# Patient Record
Sex: FEMALE | Race: WHITE
[De-identification: names, ages, dates, MRNs, and addresses within clinical notes are randomized per-mention and may not be internally consistent; named-entity substitution may affect disease eponyms.]

---

## 2020-06-30 ENCOUNTER — HOSPITAL ENCOUNTER (INPATIENT)
Dept: HOSPITAL 62 - LR | Age: 20
LOS: 4 days | Discharge: HOME | End: 2020-07-04
Attending: OBSTETRICS & GYNECOLOGY | Admitting: OBSTETRICS & GYNECOLOGY
Payer: OTHER GOVERNMENT

## 2020-06-30 DIAGNOSIS — O48.0: Primary | ICD-10-CM

## 2020-06-30 DIAGNOSIS — Z3A.41: ICD-10-CM

## 2020-06-30 DIAGNOSIS — D69.6: ICD-10-CM

## 2020-06-30 DIAGNOSIS — B00.89: ICD-10-CM

## 2020-06-30 DIAGNOSIS — F17.210: ICD-10-CM

## 2020-06-30 PROCEDURE — 84550 ASSAY OF BLOOD/URIC ACID: CPT

## 2020-06-30 PROCEDURE — 84156 ASSAY OF PROTEIN URINE: CPT

## 2020-06-30 PROCEDURE — 94760 N-INVAS EAR/PLS OXIMETRY 1: CPT

## 2020-06-30 PROCEDURE — 85027 COMPLETE CBC AUTOMATED: CPT

## 2020-06-30 PROCEDURE — 81001 URINALYSIS AUTO W/SCOPE: CPT

## 2020-06-30 PROCEDURE — 83615 LACTATE (LD) (LDH) ENZYME: CPT

## 2020-06-30 PROCEDURE — 86901 BLOOD TYPING SEROLOGIC RH(D): CPT

## 2020-06-30 PROCEDURE — 86592 SYPHILIS TEST NON-TREP QUAL: CPT

## 2020-06-30 PROCEDURE — 86850 RBC ANTIBODY SCREEN: CPT

## 2020-06-30 PROCEDURE — 80053 COMPREHEN METABOLIC PANEL: CPT

## 2020-06-30 PROCEDURE — 36415 COLL VENOUS BLD VENIPUNCTURE: CPT

## 2020-06-30 PROCEDURE — 86900 BLOOD TYPING SEROLOGIC ABO: CPT

## 2020-06-30 PROCEDURE — 86695 HERPES SIMPLEX TYPE 1 TEST: CPT

## 2020-06-30 PROCEDURE — 86696 HERPES SIMPLEX TYPE 2 TEST: CPT

## 2020-06-30 PROCEDURE — 82570 ASSAY OF URINE CREATININE: CPT

## 2020-06-30 PROCEDURE — 80307 DRUG TEST PRSMV CHEM ANLYZR: CPT

## 2020-06-30 PROCEDURE — 85025 COMPLETE CBC W/AUTO DIFF WBC: CPT

## 2020-07-01 LAB
ADD MANUAL DIFF: NO
ALBUMIN SERPL-MCNC: 3.1 G/DL (ref 3.5–5)
ALP SERPL-CCNC: 119 U/L (ref 38–126)
ANION GAP SERPL CALC-SCNC: 6 MMOL/L (ref 5–19)
APPEARANCE UR: CLEAR
APTT PPP: YELLOW S
AST SERPL-CCNC: 36 U/L (ref 14–36)
BARBITURATES UR QL SCN: NEGATIVE
BASOPHILS # BLD AUTO: 0 10^3/UL (ref 0–0.2)
BASOPHILS NFR BLD AUTO: 0.3 % (ref 0–2)
BILIRUB DIRECT SERPL-MCNC: 0 MG/DL (ref 0–0.4)
BILIRUB SERPL-MCNC: 0.3 MG/DL (ref 0.2–1.3)
BILIRUB UR QL STRIP: NEGATIVE
BUN SERPL-MCNC: 10 MG/DL (ref 7–20)
CALCIUM: 9.5 MG/DL (ref 8.4–10.2)
CHLORIDE SERPL-SCNC: 105 MMOL/L (ref 98–107)
CO2 SERPL-SCNC: 21 MMOL/L (ref 22–30)
CREAT UR-MCNC: 46.2 MG/DL (ref 16–327)
EOSINOPHIL # BLD AUTO: 0 10^3/UL (ref 0–0.6)
EOSINOPHIL NFR BLD AUTO: 0.1 % (ref 0–6)
ERYTHROCYTE [DISTWIDTH] IN BLOOD BY AUTOMATED COUNT: 13.9 % (ref 11.5–14)
GLUCOSE SERPL-MCNC: 115 MG/DL (ref 75–110)
GLUCOSE UR STRIP-MCNC: NEGATIVE MG/DL
HCT VFR BLD CALC: 35.7 % (ref 36–47)
HGB BLD-MCNC: 12.3 G/DL (ref 12–15.5)
KETONES UR STRIP-MCNC: NEGATIVE MG/DL
LYMPHOCYTES # BLD AUTO: 1.7 10^3/UL (ref 0.5–4.7)
LYMPHOCYTES NFR BLD AUTO: 16.9 % (ref 13–45)
MCH RBC QN AUTO: 31.9 PG (ref 27–33.4)
MCHC RBC AUTO-ENTMCNC: 34.5 G/DL (ref 32–36)
MCV RBC AUTO: 93 FL (ref 80–97)
METHADONE UR QL SCN: NEGATIVE
MONOCYTES # BLD AUTO: 0.5 10^3/UL (ref 0.1–1.4)
MONOCYTES NFR BLD AUTO: 4.9 % (ref 3–13)
NEUTROPHILS # BLD AUTO: 7.8 10^3/UL (ref 1.7–8.2)
NEUTS SEG NFR BLD AUTO: 77.8 % (ref 42–78)
NITRITE UR QL STRIP: NEGATIVE
PCP UR QL SCN: NEGATIVE
PH UR STRIP: 7 [PH] (ref 5–9)
PLATELET # BLD: 114 10^3/UL (ref 150–450)
POTASSIUM SERPL-SCNC: 4.1 MMOL/L (ref 3.6–5)
PROT SERPL-MCNC: 6.1 G/DL (ref 6.3–8.2)
PROT UR STRIP-MCNC: 30 MG/DL
PROT UR STRIP-MCNC: 38.8 MG/DL (ref ?–12)
RBC # BLD AUTO: 3.86 10^6/UL (ref 3.72–5.28)
SP GR UR STRIP: 1
TOTAL CELLS COUNTED % (AUTO): 100 %
UR PRO/CREAT RATIO RESULT: 0.8 MG/MG (ref 0–0.2)
URATE SERPL-MCNC: 5.8 MG/DL (ref 2.5–6.2)
URINE AMPHETAMINES SCREEN: NEGATIVE
URINE BENZODIAZEPINES SCREEN: NEGATIVE
URINE COCAINE SCREEN: NEGATIVE
URINE MARIJUANA (THC) SCREEN: NEGATIVE
UROBILINOGEN UR-MCNC: NEGATIVE MG/DL (ref ?–2)
WBC # BLD AUTO: 10.1 10^3/UL (ref 4–10.5)

## 2020-07-01 NOTE — ADMISSION PHYSICAL
=================================================================



=================================================================

Datetime Report Generated by CPN: 2020 21:03

   

   

=================================================================

CURRENT ADMISSION

=================================================================

   

Chief Complaint:  Scheduled Induction of Labor

Indication for Induction:  Postterm

Admit Impression :  Term, Intrauterine Pregnancy; No Active Labor;

   Intact Membranes; Induction of Labor

Admit Plan:  Admit to Unit; Initiate Labor Induction Protocol

   

=================================================================

ALLERGIES

=================================================================

   

Medication Allergies:  Yes

Medication Allergies:  morphine/Anaphylaxis (2020);

   codeine/Anaphylaxis (2020)

Latex:  No Latex Allergies

   

=================================================================

OBSTETRICAL HISTORY

=================================================================

   

EDC:  2020 00:00

:  1

Para:  0

Term:  0

:  0

SAB:  0

IAB:  0

Ectopic:  0

Livin

Cesareans:  0

VBACs:  0

Multiple Births:  0

Gestational Diabetes:  No

Rh Sensitization:  No

Incompetent Cervix:  No

PILAR:  No

Infertility:  No

ART Treatment:  No

Uterine Anomaly:  No

IUGR:  No

Hx Previous C/S:  No

Macrosomia:  No

Hx Loss/Stillborn:  No

PIH:  No

Hx  Death:  No

Placenta Previa/Abruption:  No

Depression/PP Depression:  No

PTL/PROM:  No

Post Partum Hemorrhage:  No

   

=================================================================

***SEE PRENATAL RECORDS***

=================================================================

   

Cigarettes:  Current Some Day Smoker. [id number]

   

=================================================================

MEDICAL HISTORY

=================================================================

   

Diabetes:  No

Blood Transfusion:  No

Pulmonary Disease (Asthma, TB):  No

Breast Disease:  No

Hypertension:  No

Gyn Surgery:  No

Heart Disease:  No

Hosp/Surgery:  Yes

Autoimmune Disorder:  No

Anesthetic Complications:  No

Kidney Disease:  No

Abnormal Pap Smear:  No

Neuro/Epilepsy:  No

Psychiatric Disorders:  No

Other Medical Diseases:  No

Hepatitis/Liver Disease:  No

Significant Family History:  No

Varicosities/Phlebitis:  No

Trauma/Violence :  No

Thyroid Dysfunction:  No

Medical History Comments:  Ovarian Teratoma at age 4

   

=================================================================

INFECTIOUS HISTORY

=================================================================

   

Gonorrhea:  No

Genital Herpes:  No

Chlamydia:  No

Tuberculosis:  No

Syphilis:  No

Hepatitis:  No

HIV/AIDS Exposure:  No

Rash or Viral Illness:  No

HPV:  No

Infectious History Comments:  Oral HSV, no h/o genital HSV

   

=================================================================

PHYSICAL EXAM

=================================================================

   

General:  Normal

HEENT:  Normal

Neurologic:  Normal

Thyroid:  Deferred

Heart:  Normal

Lungs:  Normal

Breast:  Deferred

Back:  Normal

Abdomen:  Normal

Genitourinary Exam:  Normal

Extremities:  Normal

DTRs:  Normal

Pelvic Type:  Adequate

Vital Signs:  Reviewed

   

=================================================================

VAGINAL EXAM

=================================================================

   

Dilatation:  2

Effacement:  80

Station:  -2

Contraction Comments:  irreg

   

=================================================================

MEMBRANES

=================================================================

   

Membranes:  Intact

   

=================================================================

FETUS A

=================================================================

   

EGA:  41.2

Monitoring:  External US

FHR- Baseline:  145

Variability:  Moderate 6-25bpm

Accelerations:  15X15

Decelerations:  None

FHR Category:  Category I

Fetal Presentation:  Vertex

Admit Comment:  21yo  at 41+2ega presents for scheduled IOL.  ARI

   / by LMP and c/w 1st trimester US.  56# weight gain in pregnancy.

   1hr GTT passed.  H/o ovarian teratoma at age 3yo.  H/o oral HSV. 

   Never had genital outbreak.  No oral prodrome or lesions currently. 

   Pt denies vaginal prodrome or lesions.  SSE negative for lesions. 

   TIters done for Nursery.  GBS positive - PCN for GBS prophy.  EFW

   9#4oz on .  Admit and IOL with cervidil.  Anticipate .

   

=================================================================

INFORMED CONSENT

=================================================================

   

Informed Consent Obtained:  Vaginal Delivery; Induction of Labor;

   Risks, Benefits and Alternatives Discussed

Signature:  Electronically signed by Clementina Lieberman MD ([facility]) on

   2020 at 21:02  with User ID: [email or web address]

## 2020-07-02 PROCEDURE — 0DQR0ZZ REPAIR ANAL SPHINCTER, OPEN APPROACH: ICD-10-PCS | Performed by: OBSTETRICS & GYNECOLOGY

## 2020-07-02 RX ADMIN — IBUPROFEN SCH MG: 800 TABLET, FILM COATED ORAL at 22:45

## 2020-07-02 RX ADMIN — PENICILLIN G POTASSIUM SCH MLS/HR: 5000000 POWDER, FOR SOLUTION INTRAMUSCULAR; INTRAPLEURAL; INTRATHECAL; INTRAVENOUS at 09:25

## 2020-07-02 RX ADMIN — PENICILLIN G POTASSIUM SCH: 5000000 POWDER, FOR SOLUTION INTRAMUSCULAR; INTRAPLEURAL; INTRATHECAL; INTRAVENOUS at 17:36

## 2020-07-02 RX ADMIN — PENICILLIN G POTASSIUM SCH MLS/HR: 5000000 POWDER, FOR SOLUTION INTRAMUSCULAR; INTRAPLEURAL; INTRATHECAL; INTRAVENOUS at 03:56

## 2020-07-02 RX ADMIN — PENICILLIN G POTASSIUM SCH MLS/HR: 5000000 POWDER, FOR SOLUTION INTRAMUSCULAR; INTRAPLEURAL; INTRATHECAL; INTRAVENOUS at 13:40

## 2020-07-02 NOTE — L&D PROGRESS NOTES
=================================================================

PROGRESS NOTES

=================================================================

Datetime Report Generated by CPN: 07/02/2020 15:19

   

   

=================================================================

PROGRESS NOTE

=================================================================

   

Impression:  Normal Progression of Labor

Procedures:  Sterile Vag Exam

Plan:  Continue Present Management; Anticipate Vaginal Delivery

Plan Other:  Pt may have an epidural

Informed Consent Obtained:  Vaginal Delivery; Induction of Labor;

   Risks, Benefits and Alternatives Discussed

Vital Signs :  Reviewed; Within Normal Limits

Comment:  Pt now has an epidural. Comfortable, VE per RN 8.5/90/0. 

   Position changes encouraged. Will tx for GBS + until delivery

   

=================================================================

VAGINAL EXAM

=================================================================

   

Dilatation:  2

Effacement:  80

Station:  -2

Contractions:  irreg

   

=================================================================

LAST VAGINAL EXAM-NURSING

=================================================================

   

Nursing Exam Dilitation:  8.5

Nursing Exam Effacement:  90

Nursing Exam Station:  0

   

=================================================================

MEMBRANES

=================================================================

   

Membranes:  Ruptured

Amniotic Fluid Color:  Clear

   

=================================================================

FETUS A

=================================================================

   

FHR - Baseline:  130

Monitoring:  External US

Variability:  Moderate 6-25bpm

Accelerations:  15X15

Decelerations:  None

FHR Category:  Category I

Fetal Presentation:  Vertex

   

=================================================================

SIGNATURE

=================================================================

   

SIGNATURE:  [id number]

Assignment:  Deonte Haywood MD

Signature:  Electronically signed by Arlette Parra CNM on 7/2/2020

   at 15:18  with User ID: [email or web address]

:  Electronically signed by Arlette Parra CNM on 7/2/2020 at 15:18 

   with User ID: [email or web address]

## 2020-07-02 NOTE — L&D PROGRESS NOTES
=================================================================

PROGRESS NOTES

=================================================================

Datetime Report Generated by CPN: 07/02/2020 12:08

   

   

=================================================================

PROGRESS NOTE

=================================================================

   

Impression:  Normal Progression of Labor; Reassuring Fetal Heart Rate

Procedures:  Artificial ROM; Sterile Vag Exam

Plan:  Continue Present Management; Induction; Antibiotic Therapy

Plan Other:  Pt may have an epidural

Informed Consent Obtained:  Vaginal Delivery; Induction of Labor;

   Risks, Benefits and Alternatives Discussed

Vital Signs :  Reviewed; Within Normal Limits

Comment:  IOL continues, PCN several doses infused for GBS+. Pitocin

   infusing.  VE 5/90/-1, vtx. Pt considering an epidural. Position

   changes encouraged.  Attending MD is Dr Haywood

   

=================================================================

VAGINAL EXAM

=================================================================

   

Dilatation:  2

Effacement:  80

Station:  -2

Contractions:  irreg

   

=================================================================

LAST VAGINAL EXAM-NURSING

=================================================================

   

Nursing Exam Dilitation:  5.0

Nursing Exam Effacement:  90

Nursing Exam Station:  -1

   

=================================================================

MEMBRANES

=================================================================

   

Membranes:  Ruptured

Amniotic Fluid Color:  Clear

   

=================================================================

FETUS A

=================================================================

   

FHR - Baseline:  130

Monitoring:  External US

Variability:  Moderate 6-25bpm

Accelerations:  15X15

Decelerations:  None

FHR Category:  Category I

Fetal Presentation:  Vertex

   

=================================================================

SIGNATURE

=================================================================

   

SIGNATURE:  [id number]

Assignment:  Deonte Haywood MD

Signature:  Electronically signed by Arlette Parra CNM on 7/2/2020

   at 12:07  with User ID: [email or web address]

:  Electronically signed by Arlette Parra CNM on 7/2/2020 at 12:07 

   with User ID: [email or web address]

## 2020-07-02 NOTE — DELIVERY SUMMARY
=================================================================

Del Sum A-C

=================================================================

Datetime Report Generated by CPN: 2020 19:59

   

   

=================================================================

DELIVERY PERSONNEL

=================================================================

   

DELIVERY PERSONNEL:  [id number]

Delivery Doctor::  Deonte Haywood MD

Labor and Delivery Nurse::  Priscilla Alvarado RN

Labor and Delivery Nurse::  SANFORD Lott

Nursery Nurse::  ROSALIE Cabezas

Nursery Nurse::  enedina

Scrub Tech/CNA:  Padmini Matson CNA II

Scrub Tech/CNA:  Shazia Patterson, CST

   

=================================================================

MATERNAL INFORMATION

=================================================================

   

Delivery Anesthesia:  Epidural

Medications After Delivery:  Pitocin 30 Units in 500ml NS/D5W

Delivery QBL:  300

Maternal Complications:  None

   

=================================================================

LABOR SUMMARY

=================================================================

   

EDC:  2020 00:00

No. Babies in Womb:  1

 Attempted:  No

Labor Anesthesia:  Epidural

   

=================================================================

LABOR INFORMATION

=================================================================

   

Reason for Induction:  Post Dates

Onset of Labor:  2020 12:00

Complete Dilatation:  2020 16:05

Cervical Ripening Agents:  Cervidil

Oxytocin:  Induction

Group B Beta Strep:  Positive

Antibiotics # of Doses:  4

Antibiotics Time of Last Dose:  1340

Name of Antibiotic Given:  PCN

Steroids Given:  None

Reason Steroids Not Administered:  Not Applicable

   

=================================================================

MEMBRANES

=================================================================

   

Membranes Rupture Method:  Artificial

Rupture of Membranes:  2020 12:01

Length of Rupture (hr):  5.02

Amniotic Fluid Color:  Clear

Amniotic Fluid Amount:  Small

Amniotic Fluid Odor:  Normal

   

=================================================================

STAGES OF LABOR

=================================================================

   

Stage 1 hr:  4

Stage 1 min:  5

Stage 2 hr:  0

Stage 2 min:  57

Stage 3 hr:  0

Stage 3 min:  6

Total Time in Labor hr:  5

Total Time in Labor min:  8

   

=================================================================

VAGINAL DELIVERY

=================================================================

   

Episiotomy:  None

Laceration #1:  Perineal; Vaginal

Laceration Extension #1:  Third Degree, IIIa (Less than 50 percent ext

   anal sphincter thickness torn)

Laceration Repair:  Yes

Laceration Repair Note:  sphincter repaired with 3 layers and the

   vaginal tear with 20 vicryl

   

=================================================================

CSECTION DELIVERY

=================================================================

   

Primary Indication:  N/A

Secondary Indication:  N/A

CSection Incidence:  N/A

Labor:  N/A

Elective:  N/A

CSection Incision:  N/A

   

=================================================================

BABY A INFORMATION

=================================================================

   

Infant Delivery Date/Time:  2020 17:02

Method of Delivery:  Vaginal

Nurse Controlled Delivery:  No

Born in Route :  No

:  N/A

Forceps:  N/A

Vacuum Extraction:  N/A

Shoulder Dystocia :  No

   

=================================================================

PRESENTATION/POSITION BABY A

=================================================================

   

Presentation:  Cephalic

Cephalic Presentation:  Vertex

Vertex Position:  Left Occipital Anterior

Breech Presentation:  N/A

   

=================================================================

PLACENTA INFORMATION BABY A

=================================================================

   

Placenta Delivery Time :  2020 17:08

Placenta Method of Delivery:  Spontaneous

Placenta Status:  Delivered

   

=================================================================

APGAR SCORES BABY A

=================================================================

   

Heart Rate 1 min:  >100 bpm

Resp Effort 1 min:  Good Cry

Reflex Irritability 1 min:  Cough or Sneeze or Pulls Away

Muscle Tone 1 min:  Active Motion

Color 1 min:  Body Pink, Extremities Blue

Resuscitation Effort 1 min:  Tactile Stimulation

APGAR SCORE 1 MIN:  9

Heart Rate 5 min:  >100 bpm

Resp Effort 5 min:  Good Cry

Reflex Irritability 5 min:  Cough or Sneeze or Pulls Away

Muscle Tone 5 min:  Active Motion

Color 5 min:  Completely Pink

Resuscitation Effort 5 min:  Tactile Stimulation

APGAR SCORE 5 MIN:  10

   

=================================================================

INFANT INFORMATION BABY A

=================================================================

   

Gestational Age at Delivery:  41.3

Gestational Status:  Late Term-  41- 41.6 Weeks

Infant Outcome :  Liveborn

Infant Condition :  Stable

Infant Sex:  Male

   

=================================================================

IDENTIFICATION BABY A

=================================================================

   

Infant Verification Date/Time:  2020 17:26

ID Band Number:  [id number]

Mother's Name Verified:  Yes

Infant Medical Record Number:  [id number]

RN Verifying Infant:  RKatelyn Alvarado, RN/ S. Butcher, RN

   

=================================================================

WEIGHT/LENGTH BABY A

=================================================================

   

Infant Birthweight (gm):  4104

Infant Weight (lb):  9

Infant Weight (oz):  1

Infant Length (in):  21.00

Infant Length (cm):  53.34

   

=================================================================

CORD INFORMATION BABY A

=================================================================

   

No. Cord Vessels:  3

Nuchal Cord :  N/A

Cord Blood Taken:  Yes-For Eval (Mom's Blood Type - or O+)

Infant Suction:  None

   

=================================================================

ASSESSMENT BABY A

=================================================================

   

Infant Complications:  None

Physical Findings at Delivery:  Within Normal Limits

Infant Respirations:  Appears Normal

Skin to Skin:  Yes

Neonatologist/ALS Called :  No

Infant Care By:  D. Jefferye, RNC

Transferred To:  Remains with Mother

   

=================================================================

BABY B INFORMATION

=================================================================

   

 :  N/A

   

=================================================================

SIGNATURES

=================================================================

   

Signature:  Electronically signed by Deonte Haywood MD ([place]) on

   2020 at 17:29  with User ID: [email or web address]

## 2020-07-02 NOTE — L&D PROGRESS NOTES
=================================================================

PROGRESS NOTES

=================================================================

Datetime Report Generated by CPN: 07/02/2020 08:08

   

   

=================================================================

PROGRESS NOTE

=================================================================

   

Impression:  Normal Progression of Labor

Procedures:  Sterile Vag Exam

Plan:  Continue Present Management; Induction; Cervical Ripening

Informed Consent Obtained:  Vaginal Delivery; Induction of Labor;

   Risks, Benefits and Alternatives Discussed

Vital Signs :  Reviewed

Comment:  cervidil in since last night at 2100.  1/60/-3 then.  Now cvx

   3.5/75/-2.  She declines AROM.  Reviewed if no Active labor and SROM

   by noon that would need to break water.  She is agreeable to this.

   Pitocin started.  Plan reviewed.  She verbalized understanding.

   

=================================================================

VAGINAL EXAM

=================================================================

   

Dilatation:  2

Effacement:  80

Station:  -2

Contractions:  irreg

   

=================================================================

LAST VAGINAL EXAM-NURSING

=================================================================

   

Nursing Exam Dilitation:  3.5

Nursing Exam Effacement:  75

Nursing Exam Station:  -2

   

=================================================================

MEMBRANES

=================================================================

   

Membranes:  Intact

   

=================================================================

FETUS A

=================================================================

   

FHR - Baseline:  125

Monitoring:  External US

Variability:  Moderate 6-25bpm

Accelerations:  15X15

Decelerations:  None

FHR Category:  Category I

Fetal Presentation:  Vertex

   

=================================================================

SIGNATURE

=================================================================

   

SIGNATURE:  [id number];[id number]

Signature:  Electronically signed by Clementina Lieberman MD ([email or web address]) on

   7/2/2020 at 08:07  with User ID: [email or web address]

## 2020-07-03 LAB
ERYTHROCYTE [DISTWIDTH] IN BLOOD BY AUTOMATED COUNT: 14.4 % (ref 11.5–14)
HCT VFR BLD CALC: 27.7 % (ref 36–47)
HGB BLD-MCNC: 9.5 G/DL (ref 12–15.5)
HSV1 IGG SER-ACNC: 35.1 INDEX (ref 0–0.9)
MCH RBC QN AUTO: 32.3 PG (ref 27–33.4)
MCHC RBC AUTO-ENTMCNC: 34.3 G/DL (ref 32–36)
MCV RBC AUTO: 94 FL (ref 80–97)
PLATELET # BLD: 87 10^3/UL (ref 150–450)
RBC # BLD AUTO: 2.95 10^6/UL (ref 3.72–5.28)
WBC # BLD AUTO: 10.7 10^3/UL (ref 4–10.5)

## 2020-07-03 RX ADMIN — DOCUSATE SODIUM SCH MG: 100 CAPSULE, LIQUID FILLED ORAL at 10:31

## 2020-07-03 RX ADMIN — ACETAMINOPHEN PRN MG: 325 TABLET ORAL at 22:29

## 2020-07-03 RX ADMIN — DOCUSATE SODIUM SCH: 100 CAPSULE, LIQUID FILLED ORAL at 06:08

## 2020-07-03 RX ADMIN — IBUPROFEN SCH: 800 TABLET, FILM COATED ORAL at 22:30

## 2020-07-03 RX ADMIN — FAMOTIDINE SCH MG: 20 TABLET, FILM COATED ORAL at 10:33

## 2020-07-03 RX ADMIN — ACYCLOVIR SCH: 200 CAPSULE ORAL at 10:00

## 2020-07-03 RX ADMIN — ACYCLOVIR SCH MG: 200 CAPSULE ORAL at 17:20

## 2020-07-03 RX ADMIN — IBUPROFEN SCH: 800 TABLET, FILM COATED ORAL at 08:30

## 2020-07-03 RX ADMIN — IBUPROFEN SCH: 800 TABLET, FILM COATED ORAL at 14:01

## 2020-07-03 RX ADMIN — FAMOTIDINE SCH MG: 20 TABLET, FILM COATED ORAL at 21:11

## 2020-07-03 RX ADMIN — Medication SCH CAP: at 10:31

## 2020-07-03 RX ADMIN — SENNOSIDES, DOCUSATE SODIUM SCH EACH: 50; 8.6 TABLET, FILM COATED ORAL at 10:31

## 2020-07-03 RX ADMIN — FERROUS SULFATE TAB 325 MG (65 MG ELEMENTAL FE) SCH MG: 325 (65 FE) TAB at 10:31

## 2020-07-03 RX ADMIN — DOCUSATE SODIUM SCH MG: 100 CAPSULE, LIQUID FILLED ORAL at 17:22

## 2020-07-03 RX ADMIN — FERROUS SULFATE TAB 325 MG (65 MG ELEMENTAL FE) SCH: 325 (65 FE) TAB at 06:08

## 2020-07-03 RX ADMIN — FAMOTIDINE SCH: 20 TABLET, FILM COATED ORAL at 06:09

## 2020-07-03 RX ADMIN — FERROUS SULFATE TAB 325 MG (65 MG ELEMENTAL FE) SCH MG: 325 (65 FE) TAB at 17:22

## 2020-07-03 NOTE — PDOC PROGRESS REPORT
Subjective-OB


Progress Note for:: 07/03/20


Subjective: 





Doing well, no c/o, breastfeeding, voiding, walking, scant bleeding





Physical Exam (OB)


Vital Signs: 


                                        











Temp Pulse Resp BP Pulse Ox


 


 98.6 F   77   18   151/82 H  100 


 


 07/03/20 07:19  07/03/20 07:19  07/03/20 07:19  07/03/20 07:19  07/03/20 07:19








                                 Intake & Output











 07/02/20 07/03/20 07/04/20





 06:59 06:59 06:59


 


Weight 86.4 kg  














- PIH/Pre-Eclampsia


DTR's: 2 +


Clonus: Negative


Headache: Absent


Epigastric Pain: No


Visual Changes: No





- Lochia


Lochia Amount: Scant < 10 ml


Lochia Color: Rubra/Red





- Abdomen


Description: Soft, Round


Fundal Description: Firm, Midline


Fundal Height: u/u - u/2





Objective-Diagnostic


Laboratory: 


                                        





                                 07/03/20 06:59 





                                 07/01/20 20:29 





                                        











  07/03/20





  06:59


 


WBC  10.7 H


 


RBC  2.95 L


 


Hgb  9.5 L D


 


Hct  27.7 L


 


MCV  94


 


MCH  32.3


 


MCHC  34.3


 


RDW  14.4 H


 


Plt Count  87 L














Assessment and Plan(PN)





- Assessment and Plan


(1) Pre-eclampsia


Qualifiers: 


   Trimester: third trimester   Qualified Code(s): O14.93 - Unspecified pre-

eclampsia, third trimester   


Is this a current diagnosis for this admission?: Yes   





(2) Gestational thrombocytopenia


Qualifiers: 


   Trimester: unspecified trimester   Qualified Code(s): O99.119 - Other diseas

es of the blood and blood-forming organs and certain disorders involving the 

immune mechanism complicating pregnancy, unspecified trimester; D69.6 - 

Thrombocytopenia, unspecified   


Is this a current diagnosis for this admission?: Yes   





(3) Elective induction of labor planned


Is this a current diagnosis for this admission?: Yes   





(4) Post-term pregnancy, 40-42 weeks of gestation


Is this a current diagnosis for this admission?: Yes   





(5) Carrier of group B Streptococcus


Is this a current diagnosis for this admission?: Yes   





- Time Spent with Patient


Time with patient: Less than 15 minutes


Medications reviewed and adjusted accordingly: Yes





- Disposition


Anticipated Discharge: Home


Within: within 24 hours

## 2020-07-03 NOTE — PDOC PROGRESS REPORT
Subjective-OB


Progress Note for:: 07/03/20


Subjective: 





Doing well, no c/o, voiding, mod bleeding





Physical Exam (OB)


Vital Signs: 


                                        











Temp Pulse Resp BP Pulse Ox


 


 98.6 F   77   18   151/82 H  100 


 


 07/03/20 07:19  07/03/20 07:19  07/03/20 07:19  07/03/20 07:19  07/03/20 07:19








                                 Intake & Output











 07/02/20 07/03/20 07/04/20





 06:59 06:59 06:59


 


Weight 86.4 kg  














- PIH/Pre-Eclampsia


DTR's: 2 +


Clonus: Negative


Headache: Absent


Epigastric Pain: No


Visual Changes: No





- Lochia


Lochia Amount: Scant < 10 ml


Lochia Color: Rubra/Red





- Abdomen


Description: Soft, Round


Fundal Description: Firm, Midline


Fundal Height: u/u - u/2





Objective-Diagnostic


Laboratory: 


                                        





                                 07/03/20 06:59 





                                 07/01/20 20:29 





                                        











  07/03/20





  06:59


 


WBC  10.7 H


 


RBC  2.95 L


 


Hgb  9.5 L D


 


Hct  27.7 L


 


MCV  94


 


MCH  32.3


 


MCHC  34.3


 


RDW  14.4 H


 


Plt Count  87 L














Assessment and Plan(PN)





- Assessment and Plan


(1) Pre-eclampsia


Qualifiers: 


   Trimester: third trimester   Qualified Code(s): O14.93 - Unspecified pre-

eclampsia, third trimester   


Is this a current diagnosis for this admission?: Yes   





(2) Gestational thrombocytopenia


Qualifiers: 


   Trimester: unspecified trimester   Qualified Code(s): O99.119 - Other 

diseases of the blood and blood-forming organs and certain disorders involving 

the immune mechanism complicating pregnancy, unspecified trimester; D69.6 - 

Thrombocytopenia, unspecified   


Is this a current diagnosis for this admission?: Yes   





(3) Elective induction of labor planned


Is this a current diagnosis for this admission?: Yes   





(4) Post-term pregnancy, 40-42 weeks of gestation


Is this a current diagnosis for this admission?: Yes   





(5) Carrier of group B Streptococcus


Is this a current diagnosis for this admission?: Yes   





- Time Spent with Patient


Medications reviewed and adjusted accordingly: Yes





- Disposition


Anticipated Discharge: Home

## 2020-07-04 VITALS — DIASTOLIC BLOOD PRESSURE: 79 MMHG | SYSTOLIC BLOOD PRESSURE: 128 MMHG

## 2020-07-04 RX ADMIN — FERROUS SULFATE TAB 325 MG (65 MG ELEMENTAL FE) SCH MG: 325 (65 FE) TAB at 09:52

## 2020-07-04 RX ADMIN — DOCUSATE SODIUM SCH MG: 100 CAPSULE, LIQUID FILLED ORAL at 09:52

## 2020-07-04 RX ADMIN — SENNOSIDES, DOCUSATE SODIUM SCH EACH: 50; 8.6 TABLET, FILM COATED ORAL at 09:52

## 2020-07-04 RX ADMIN — IBUPROFEN SCH: 800 TABLET, FILM COATED ORAL at 14:48

## 2020-07-04 RX ADMIN — Medication SCH CAP: at 09:53

## 2020-07-04 RX ADMIN — IBUPROFEN SCH: 800 TABLET, FILM COATED ORAL at 06:00

## 2020-07-04 RX ADMIN — ACYCLOVIR SCH MG: 200 CAPSULE ORAL at 09:53

## 2020-07-04 RX ADMIN — FAMOTIDINE SCH MG: 20 TABLET, FILM COATED ORAL at 09:52

## 2020-07-04 RX ADMIN — ACETAMINOPHEN PRN MG: 325 TABLET ORAL at 05:40

## 2020-07-04 NOTE — PDOC PROGRESS REPORT
Subjective-OB


Progress Note for:: 07/04/20


Subjective: 





Doing well, ready to go home, no BM, voiding, eating well, hsb at BS





Physical Exam (OB)


Vital Signs: 


                                        











Temp Pulse Resp BP Pulse Ox


 


 98.1 F   88   18   128/79 H  100 


 


 07/04/20 07:22  07/04/20 08:20  07/04/20 07:22  07/04/20 08:20  07/04/20 08:20














- PIH/Pre-Eclampsia


DTR's: 2 +


Clonus: Negative


Headache: Absent


Epigastric Pain: No


Visual Changes: No





- Lochia


Lochia Amount: Scant < 10 ml


Lochia Color: Rubra/Red





- Abdomen


Description: Soft


Fundal Description: Firm, Midline


Fundal Height: u/u - u/2





Objective-Diagnostic


Laboratory: 


                                        





                                 07/03/20 06:59 





                                 07/01/20 20:29 











Assessment and Plan(PN)





- Assessment and Plan


(1) Pre-eclampsia


Qualifiers: 


   Trimester: third trimester   Qualified Code(s): O14.93 - Unspecified pre-

eclampsia, third trimester   


Is this a current diagnosis for this admission?: Yes   





(2) Gestational thrombocytopenia


Qualifiers: 


   Trimester: unspecified trimester   Qualified Code(s): O99.119 - Other di

seases of the blood and blood-forming organs and certain disorders involving the

immune mechanism complicating pregnancy, unspecified trimester; D69.6 - 

Thrombocytopenia, unspecified   


Is this a current diagnosis for this admission?: Yes   





(3) Elective induction of labor planned


Is this a current diagnosis for this admission?: Yes   





(4) Post-term pregnancy, 40-42 weeks of gestation


Is this a current diagnosis for this admission?: Yes   





(5) Carrier of group B Streptococcus


Is this a current diagnosis for this admission?: Yes   





- Time Spent with Patient


Time with patient: Less than 15 minutes


Medications reviewed and adjusted accordingly: Yes





- Disposition


Anticipated Discharge: Home


Within: within 24 hours

## 2020-07-04 NOTE — PDOC DISCHARGE SUMMARY
Impression





- Admit/DC Date/PCP


Admission Date/Primary Care Provider: 


  07/01/20 20:06





  





Discharge Date: 07/04/20





- Discharge Diagnosis


(1) Pre-eclampsia


Is this a current diagnosis for this admission?: Yes   





(2) Gestational thrombocytopenia


Is this a current diagnosis for this admission?: Yes   





(3) Elective induction of labor planned


Is this a current diagnosis for this admission?: Yes   





(4) Post-term pregnancy, 40-42 weeks of gestation


Is this a current diagnosis for this admission?: Yes   





(5) Carrier of group B Streptococcus


Is this a current diagnosis for this admission?: Yes   





- Additional Information


Resuscitation Status: Full Code


Discharge Diet: As Tolerated, Regular


Discharge Activity: Activity As Tolerated, Pelvic Rest


Referrals: 


GAMAL COBURN MD [ACTIVE STAFF] -  ([facility] 2 weeks)


Home Medications: 








Acyclovir [Acyclovir 400 mg Tablet] 1 tab PO BID 07/01/20 


Prenat 115/Iron Fum/Folic/Dss [Prenatal 19 Tablet] 1 tab PO DAILY 07/01/20 











HPI


Gestational Age: 41.3


Reason(s) for Admission: Induction of Labor, Group B Strep Positive


Admission Note: 





post dates


Prenatal Procedures: NST, Ultrasound


Intrapartum Procedure(s): Spontaneous Vaginal Delivery


Postpartum Complication(s): Laceration-Vaginal


Laceration-Degree: 3rd





Hospital Course


Hospital Course: 


routine





Results


Laboratory Results: 


                                        











WBC  10.7 10^3/uL (4.0-10.5)  H  07/03/20  06:59    


 


RBC  2.95 10^6/uL (3.72-5.28)  L  07/03/20  06:59    


 


Hgb  9.5 g/dL (12.0-15.5)  L D 07/03/20  06:59    


 


Hct  27.7 % (36.0-47.0)  L  07/03/20  06:59    


 


MCV  94 fl (80-97)   07/03/20  06:59    


 


MCH  32.3 pg (27.0-33.4)   07/03/20  06:59    


 


MCHC  34.3 g/dL (32.0-36.0)   07/03/20  06:59    


 


RDW  14.4 % (11.5-14.0)  H  07/03/20  06:59    


 


Plt Count  87 10^3/uL (150-450)  L  07/03/20  06:59    


 


Lymph % (Auto)  16.9 % (13-45)   07/01/20  20:29    


 


Mono % (Auto)  4.9 % (3-13)   07/01/20  20:29    


 


Eos % (Auto)  0.1 % (0-6)   07/01/20  20:29    


 


Baso % (Auto)  0.3 % (0-2)   07/01/20  20:29    


 


Absolute Neuts (auto)  7.8 10^3/uL (1.7-8.2)   07/01/20  20:29    


 


Absolute Lymphs (auto)  1.7 10^3/uL (0.5-4.7)   07/01/20 20:29    


 


Absolute Monos (auto)  0.5 10^3/uL (0.1-1.4)   07/01/20 20:29    


 


Absolute Eos (auto)  0.0 10^3/uL (0.0-0.6)   07/01/20 20:29    


 


Absolute Basos (auto)  0.0 10^3/uL (0.0-0.2)   07/01/20  20:29    


 


Seg Neutrophils %  77.8 % (42-78)   07/01/20  20:29    


 


Sodium  131.6 mmol/L (137-145)  L  07/01/20 20:29    


 


Potassium  4.1 mmol/L (3.6-5.0)   07/01/20  20:29    


 


Chloride  105 mmol/L ()   07/01/20 20:29    


 


Carbon Dioxide  21 mmol/L (22-30)  L  07/01/20 20:29    


 


Anion Gap  6  (5-19)   07/01/20 20:29    


 


BUN  10 mg/dL (7-20)   07/01/20 20:29    


 


Creatinine  0.76 mg/dL (0.52-1.25)   07/01/20  20:29    


 


Est GFR ( Amer)  > 60  (>60)   07/01/20  20:29    


 


Est GFR (MDRD) Non-Af  > 60  (>60)   07/01/20  20:29    


 


Glucose  115 mg/dL ()  H  07/01/20  20:29    


 


Uric Acid  5.8 mg/dL (2.5-6.2)   07/01/20 20:29    


 


Calcium  9.5 mg/dL (8.4-10.2)   07/01/20  20:29    


 


Total Bilirubin  0.3 mg/dL (0.2-1.3)   07/01/20  20:29    


 


Direct Bilirubin  0.0 mg/dL (0.0-0.4)   07/01/20  20:29    


 


Neonat Total Bilirubin  Not Reportable   07/01/20  20:29    


 


Neonat Direct Bilirubin  Not Reportable   07/01/20  20:29    


 


Neonat Indirect Bili  Not Reportable   07/01/20  20:29    


 


AST  36 U/L (14-36)   07/01/20  20:29    


 


ALT  21 U/L (<35)   07/01/20  20:29    


 


Alkaline Phosphatase  119 U/L ()   07/01/20  20:29    


 


Lactate Dehydrogenase  209 U/L (120-246)   07/01/20  20:29    


 


Total Protein  6.1 g/dL (6.3-8.2)  L  07/01/20  20:29    


 


Albumin  3.1 g/dL (3.5-5.0)  L  07/01/20  20:29    


 


Urine Color  YELLOW   07/01/20  20:20    


 


Urine Appearance  CLEAR   07/01/20  20:20    


 


Urine pH  7.0  (5.0-9.0)   07/01/20  20:20    


 


Ur Specific Gravity  1.004   07/01/20  20:20    


 


Urine Protein  30 mg/dL (NEGATIVE)  H  07/01/20  20:20    


 


Urine Glucose (UA)  NEGATIVE mg/dL (NEGATIVE)   07/01/20  20:20    


 


Urine Ketones  NEGATIVE mg/dL (NEGATIVE)   07/01/20  20:20    


 


Urine Blood  NEGATIVE  (NEGATIVE)   07/01/20  20:20    


 


Urine Nitrite  NEGATIVE  (NEGATIVE)   07/01/20  20:20    


 


Urine Bilirubin  NEGATIVE  (NEGATIVE)   07/01/20  20:20    


 


Urine Urobilinogen  NEGATIVE mg/dL (<2.0)   07/01/20  20:20    


 


Ur Leukocyte Esterase  NEGATIVE  (NEGATIVE)   07/01/20  20:20    


 


Urine WBC (Auto)  1 /HPF  07/01/20  20:20    


 


Urine RBC (Auto)  0 /HPF  07/01/20  20:20    


 


Urine Bacteria (Auto)  1+ /HPF  07/01/20  20:20    


 


Squamous Epi Cells Auto  5 /HPF  07/01/20  20:20    


 


Urine Mucus (Auto)  RARE /LPF  07/01/20  20:20    


 


Urine Creatinine  46.2 mg/dL ()   07/01/20  20:20    


 


Protein/Creatinin Ratio  0.8 mg/mg (0.0-0.2)  H  07/01/20  20:20    


 


Urine Total Protein  38.8 mg/dL (<12)  H  07/01/20  20:20    


 


Urine Ascorbic Acid  NEGATIVE  (NEGATIVE)   07/01/20  20:20    


 


Urine Opiates Screen  NEGATIVE   07/01/20  20:20    


 


Urine Methadone Screen  NEGATIVE   07/01/20  20:20    


 


Ur Barbiturates Screen  NEGATIVE   07/01/20  20:20    


 


Ur Phencyclidine Scrn  NEGATIVE   07/01/20  20:20    


 


Ur Amphetamines Screen  NEGATIVE   07/01/20  20:20    


 


U Benzodiazepines Scrn  NEGATIVE   07/01/20  20:20    


 


Urine Cocaine Screen  NEGATIVE   07/01/20  20:20    


 


U Marijuana (THC) Screen  NEGATIVE   07/01/20  20:20    


 


RPR  NONREACTIVE  (NONREACTIVE)   07/01/20  20:29    


 


HSV I IgG Ab  35.10 index (0.00-0.90)  H  07/01/20  20:29    


 


HSV II Specific Ab  < 0.91 index (0.00-0.90)   07/01/20  20:29    


 


Blood Type  O POSITIVE   07/01/20  20:29    


 


Antibody Screen  NEGATIVE   07/01/20  20:29    














Plan


Health Concerns: 


3rd degree, stool softners


Plan of Treatment: 


d/c home, rev S&S to report, nothing in rectum, increase fiber and stool softner


Goals: 


no complications


Time Spent: Less than 30 Minutes